# Patient Record
Sex: MALE | Race: BLACK OR AFRICAN AMERICAN | NOT HISPANIC OR LATINO | URBAN - METROPOLITAN AREA
[De-identification: names, ages, dates, MRNs, and addresses within clinical notes are randomized per-mention and may not be internally consistent; named-entity substitution may affect disease eponyms.]

---

## 2023-08-10 ENCOUNTER — OFFICE (OUTPATIENT)
Dept: URBAN - METROPOLITAN AREA CLINIC 34 | Facility: CLINIC | Age: 68
End: 2023-08-10
Payer: COMMERCIAL

## 2023-08-10 VITALS
SYSTOLIC BLOOD PRESSURE: 129 MMHG | DIASTOLIC BLOOD PRESSURE: 79 MMHG | WEIGHT: 207 LBS | TEMPERATURE: 97.6 F | HEART RATE: 79 BPM | HEIGHT: 75 IN

## 2023-08-10 DIAGNOSIS — Z86.79 PERSONAL HISTORY OF OTHER DISEASES OF THE CIRCULATORY SYSTEM: ICD-10-CM

## 2023-08-10 DIAGNOSIS — R63.4 ABNORMAL WEIGHT LOSS: ICD-10-CM

## 2023-08-10 DIAGNOSIS — F10.20 ALCOHOL DEPENDENCE, UNCOMPLICATED: ICD-10-CM

## 2023-08-10 DIAGNOSIS — R10.31 RIGHT LOWER QUADRANT PAIN: ICD-10-CM

## 2023-08-10 DIAGNOSIS — Z12.11 ENCOUNTER FOR SCREENING FOR MALIGNANT NEOPLASM OF COLON: ICD-10-CM

## 2023-08-10 DIAGNOSIS — Z86.010 PERSONAL HISTORY OF COLONIC POLYPS: ICD-10-CM

## 2023-08-10 PROCEDURE — 99204 OFFICE O/P NEW MOD 45 MIN: CPT | Performed by: PHYSICIAN ASSISTANT

## 2023-08-10 NOTE — SERVICEHPINOTES
Mr. Santacruz is a 67 YoM with history of colon polyps, EtOH dependence, anxiety, arthritis, HTN, who presents to the office at the referral of his PCP for coordination of colonoscopy. He has been experiencing gradual unintentional WL in the last year. He states he has also been having intermittent right sided abdominal pain since February - no clear exacerbating or alleviating factors. He says this may be related to pulled muscle, but he has not had any imaging or testing to further w/u pain. He reports regular bowel habits (BMs most days), no significant constipation, diarrhea, melena, rectal bleeding. He does not experience reflux/heartburn, N/V, or dysphagia. He reports having had a colonoscopy >10 yrs ago. He states he had some polyps, details unknown. br He has history of HTN, no other diagnosed cardiac conditions. He takes aspirin 81 mg daily for primary prevention. He did have evaluation with [facility] cardiologist Dr. Lew recently. He denies any pulmonary conditions. He has hx EtOH dependence - currently drinking 3 beers daily + liquor - seems to correlate to chronic pain/arthritis. He has hx back/joint surgeries. He does frequently take aleve. No AC or prescription antiPlt.

## 2023-08-10 NOTE — SERVICENOTES
I personally discussed the procedure with the patient and his wife, including the risks/benefits of colonoscopy. All questions answered.

## 2023-09-06 LAB
COMPREHENSIVE METABOLIC PANEL: ALBUMIN/GLOBULIN RATIO: 1.2 (CALC) (ref 1–2.5)
COMPREHENSIVE METABOLIC PANEL: ALBUMIN: 4.1 G/DL (ref 3.6–5.1)
COMPREHENSIVE METABOLIC PANEL: ALKALINE PHOSPHATASE: 121 U/L (ref 35–144)
COMPREHENSIVE METABOLIC PANEL: ALT: 17 U/L (ref 9–46)
COMPREHENSIVE METABOLIC PANEL: AST: 31 U/L (ref 10–35)
COMPREHENSIVE METABOLIC PANEL: BILIRUBIN, TOTAL: 0.4 MG/DL (ref 0.2–1.2)
COMPREHENSIVE METABOLIC PANEL: BUN/CREATININE RATIO: (no result) (CALC)
COMPREHENSIVE METABOLIC PANEL: CALCIUM: 9.1 MG/DL (ref 8.6–10.3)
COMPREHENSIVE METABOLIC PANEL: CARBON DIOXIDE: 24 MMOL/L (ref 20–32)
COMPREHENSIVE METABOLIC PANEL: CHLORIDE: 100 MMOL/L (ref 98–110)
COMPREHENSIVE METABOLIC PANEL: CREATININE: 1.01 MG/DL (ref 0.7–1.35)
COMPREHENSIVE METABOLIC PANEL: EGFR: 82 ML/MIN/1.73M2 (ref 60–?)
COMPREHENSIVE METABOLIC PANEL: GLOBULIN: 3.4 G/DL (CALC) (ref 1.9–3.7)
COMPREHENSIVE METABOLIC PANEL: GLUCOSE: 88 MG/DL (ref 65–99)
COMPREHENSIVE METABOLIC PANEL: POTASSIUM: 3.9 MMOL/L (ref 3.5–5.3)
COMPREHENSIVE METABOLIC PANEL: PROTEIN, TOTAL: 7.5 G/DL (ref 6.1–8.1)
COMPREHENSIVE METABOLIC PANEL: SODIUM: 133 MMOL/L — LOW (ref 135–146)
COMPREHENSIVE METABOLIC PANEL: UREA NITROGEN (BUN): 23 MG/DL (ref 7–25)

## 2023-10-03 ENCOUNTER — AMBULATORY SURGICAL CENTER (OUTPATIENT)
Dept: URBAN - METROPOLITAN AREA SURGERY 23 | Facility: SURGERY | Age: 68
End: 2023-10-03
Payer: COMMERCIAL

## 2023-10-03 DIAGNOSIS — Z86.010 PERSONAL HISTORY OF COLONIC POLYPS: ICD-10-CM

## 2023-10-03 DIAGNOSIS — D12.2 BENIGN NEOPLASM OF ASCENDING COLON: ICD-10-CM

## 2023-10-03 DIAGNOSIS — R63.4 ABNORMAL WEIGHT LOSS: ICD-10-CM

## 2023-10-03 DIAGNOSIS — D12.3 BENIGN NEOPLASM OF TRANSVERSE COLON: ICD-10-CM

## 2023-10-03 DIAGNOSIS — K64.1 SECOND DEGREE HEMORRHOIDS: ICD-10-CM

## 2023-10-03 DIAGNOSIS — R10.31 RIGHT LOWER QUADRANT PAIN: ICD-10-CM

## 2023-10-03 PROCEDURE — 45385 COLONOSCOPY W/LESION REMOVAL: CPT | Performed by: INTERNAL MEDICINE

## 2024-03-15 ENCOUNTER — OFFICE (OUTPATIENT)
Dept: URBAN - METROPOLITAN AREA CLINIC 79 | Facility: CLINIC | Age: 69
End: 2024-03-15
Payer: COMMERCIAL

## 2024-03-15 VITALS
TEMPERATURE: 97.2 F | DIASTOLIC BLOOD PRESSURE: 82 MMHG | HEIGHT: 75 IN | SYSTOLIC BLOOD PRESSURE: 117 MMHG | WEIGHT: 186 LBS | HEART RATE: 75 BPM

## 2024-03-15 DIAGNOSIS — I82.409 ACUTE EMBOLISM AND THROMBOSIS OF UNSPECIFIED DEEP VEINS OF U: ICD-10-CM

## 2024-03-15 DIAGNOSIS — E83.19 OTHER DISORDERS OF IRON METABOLISM: ICD-10-CM

## 2024-03-15 DIAGNOSIS — R74.8 ABNORMAL LEVELS OF OTHER SERUM ENZYMES: ICD-10-CM

## 2024-03-15 DIAGNOSIS — F10.20 ALCOHOL DEPENDENCE, UNCOMPLICATED: ICD-10-CM

## 2024-03-15 PROCEDURE — 99215 OFFICE O/P EST HI 40 MIN: CPT | Performed by: PHYSICIAN ASSISTANT

## 2024-06-07 ENCOUNTER — OFFICE (OUTPATIENT)
Dept: URBAN - METROPOLITAN AREA CLINIC 34 | Facility: CLINIC | Age: 69
End: 2024-06-07

## 2024-06-07 ENCOUNTER — OFFICE (OUTPATIENT)
Dept: URBAN - METROPOLITAN AREA CLINIC 79 | Facility: CLINIC | Age: 69
End: 2024-06-07
Payer: MEDICARE

## 2024-06-07 VITALS
WEIGHT: 193 LBS | HEART RATE: 90 BPM | HEIGHT: 75 IN | DIASTOLIC BLOOD PRESSURE: 80 MMHG | SYSTOLIC BLOOD PRESSURE: 151 MMHG | TEMPERATURE: 98.6 F

## 2024-06-07 DIAGNOSIS — D50.9 IRON DEFICIENCY ANEMIA, UNSPECIFIED: ICD-10-CM

## 2024-06-07 DIAGNOSIS — F10.20 ALCOHOL DEPENDENCE, UNCOMPLICATED: ICD-10-CM

## 2024-06-07 DIAGNOSIS — R74.8 ABNORMAL LEVELS OF OTHER SERUM ENZYMES: ICD-10-CM

## 2024-06-07 DIAGNOSIS — I82.409 ACUTE EMBOLISM AND THROMBOSIS OF UNSPECIFIED DEEP VEINS OF U: ICD-10-CM

## 2024-06-07 PROCEDURE — 99214 OFFICE O/P EST MOD 30 MIN: CPT | Performed by: PHYSICIAN ASSISTANT

## 2024-06-07 PROCEDURE — 00031: CPT | Performed by: INTERNAL MEDICINE

## 2025-04-08 ENCOUNTER — OFFICE (OUTPATIENT)
Dept: URBAN - METROPOLITAN AREA CLINIC 79 | Facility: CLINIC | Age: 70
End: 2025-04-08
Payer: MEDICARE

## 2025-04-08 VITALS
TEMPERATURE: 97.5 F | HEIGHT: 75 IN | SYSTOLIC BLOOD PRESSURE: 136 MMHG | DIASTOLIC BLOOD PRESSURE: 83 MMHG | HEART RATE: 69 BPM

## 2025-04-08 DIAGNOSIS — R74.8 ABNORMAL LEVELS OF OTHER SERUM ENZYMES: ICD-10-CM

## 2025-04-08 DIAGNOSIS — F17.200 NICOTINE DEPENDENCE, UNSPECIFIED, UNCOMPLICATED: ICD-10-CM

## 2025-04-08 DIAGNOSIS — F10.20 ALCOHOL DEPENDENCE, UNCOMPLICATED: ICD-10-CM

## 2025-04-08 DIAGNOSIS — Z86.718 PERSONAL HISTORY OF OTHER VENOUS THROMBOSIS AND EMBOLISM: ICD-10-CM

## 2025-04-08 PROCEDURE — 99214 OFFICE O/P EST MOD 30 MIN: CPT

## 2025-04-08 NOTE — SERVICEHPINOTES
GIANCARLO STRICKLAND   is a   69   male with PMHx of EtoH dependence, HTN, arthritis, anxiety, DVT on Xarelto who presents for follow up.  He is wheelchair-bound and accompanied by his wife.  Seen in March 2024 for abnormal liver labs (labs ordered by PCP)--"2/8/24 Hgb 12.3, MCV 96, plt 161, iron sat >90, TIBC &lt174, ferritin 3511, creat 1.18, Na 130, K+ 3.0, alb 3.3, bili 2.2, AST//81, alk phos 389."  br
br
Followed by hematology who advised EGD in 08/2024 d/t h/o macrocytic anemia.  EGD findings--"duodenal inflammation and gastritis path: Normal duodenum, no celiac in 2nd portion, duodenitis seen in the bulb, atrophic gastritis but no intestinal metaplasia." ELF score checked at visit in 10/2024--11.64 indicating high risk of liver disease progression.br
brPt c/o chronic body/joint pain.  He continues to drink EtoH daily--1-2 beers with 3-4 shots of liquor daily.  Denies abdominal pain or GI complaints.  He did seek ER tx over the weekend d/t fall while intoxicated.  Lab findings:  PT/INR 21.1/1.8, total bili 0.2, AST 48, Hgb 11.0, Hct 33.3, MCV 98.5, plt 242, ethanol quant 292CT abd/pelvis impression:  "severe arthrosis left hip.  Partial healing of right posterior fifth and sixth rib fractures.  Mild fatty infiltration of the liver.  Degenerative changes thoracic and lumbar spine."  br